# Patient Record
Sex: MALE | Race: WHITE | NOT HISPANIC OR LATINO | Employment: OTHER | URBAN - METROPOLITAN AREA
[De-identification: names, ages, dates, MRNs, and addresses within clinical notes are randomized per-mention and may not be internally consistent; named-entity substitution may affect disease eponyms.]

---

## 2017-06-26 ENCOUNTER — PRE VISIT (OUTPATIENT)
Dept: ORTHOPEDICS | Facility: CLINIC | Age: 77
End: 2017-06-26

## 2017-06-26 NOTE — TELEPHONE ENCOUNTER
1.  Date/reason for appt: 6/30/17 - Left Foot Great Toe Ingrown Toenail  2.  Referring provider: self  3.  Call to patient (Yes / No - short description): no, per appt notes: no outside records per pt  4.  Previous care at / records requested from: NONE

## 2017-06-30 ENCOUNTER — OFFICE VISIT (OUTPATIENT)
Dept: ORTHOPEDICS | Facility: CLINIC | Age: 77
End: 2017-06-30

## 2017-06-30 DIAGNOSIS — L60.0 ONYCHOCRYPTOSIS: Primary | ICD-10-CM

## 2017-06-30 DIAGNOSIS — M79.675 PAIN OF TOE OF LEFT FOOT: ICD-10-CM

## 2017-06-30 RX ORDER — TIMOLOL MALEATE 5 MG/ML
SOLUTION/ DROPS OPHTHALMIC
COMMUNITY
Start: 2017-05-21 | End: 2017-10-06

## 2017-06-30 NOTE — LETTER
6/30/2017       RE: Lucius Estrada  [address] [place] [address]     Dear Colleague,    Thank you for referring your patient, Lucius Estrada, to the [facility] at [facility]. Please see a copy of my visit note below.    Date of Service: 6/30/2017    Chief Complaint:   Chief Complaint   Patient presents with     Consult     Ingrown, left hallux. Pt stated that if you press on the left great toe nail it is painful. Pt is wondering if the doctor can clip his toenails and check his fet.         HPI: Lucius is a 76 year old male who presents today for further evaluation of left hallux ingrown nail. He relates that this is a chronic problem for him, although he does not feel like it is infected at this time. He has a podiatrist that he sees in [place] when this happens, as he spends half of the year there.     Review of Systems: No N/V/D/F/C/NS/CP/SOB    PMH:   Past Medical History:   Diagnosis Date     Basal cell carcinoma     melanoma x 2,  and multiple basal cell; see's Dr. Chavez and Dr. Victoria     BPH (benign prostatic hypertrophy)      Colon polyps     multiple; took sulindac     Glaucoma      Hypertension     ECHO 2016- Left ventricular systolic function is normal. The visual ejection fraction is estimated at 55-60%       PSxH:   Past Surgical History:   Procedure Laterality Date     COLONOSCOPY  11/1/2011    Procedure:COMBINED COLONOSCOPY, REMOVE TUMOR/POLYP/LESION BY SNARE; Colonoscopy ; Surgeon:VANESSA HALL; Location: GI     COLONOSCOPY  2/18/2014    Procedure: COMBINED COLONOSCOPY, SINGLE BIOPSY/POLYPECTOMY BY BIOPSY;;  Surgeon: Rudolph Tate MD;  Location:  GI     COLONOSCOPY  5/14/2014    Procedure: COMBINED COLONOSCOPY, SINGLE BIOPSY/POLYPECTOMY BY BIOPSY;  Surgeon: Rudolph Tate MD;  Location:  GI     COLONOSCOPY N/A 12/8/2014    Procedure: COMBINED COLONOSCOPY, SINGLE OR MULTIPLE BIOPSY/POLYPECTOMY BY BIOPSY;  Surgeon: Bebeto  Rudolph CASTAÑEDA MD;  Location:  GI     COLONOSCOPY N/A 9/21/2015    Procedure: COMBINED COLONOSCOPY, SINGLE OR MULTIPLE BIOPSY/POLYPECTOMY BY BIOPSY;  Surgeon: Rudolph Tate MD;  Location:  GI     COLONOSCOPY N/A 4/18/2016    Procedure: COLONOSCOPY;  Surgeon: Rudolph Tate MD;  Location:  GI     ESOPHAGOSCOPY, GASTROSCOPY, DUODENOSCOPY (EGD), COMBINED N/A 4/18/2016    Procedure: COMBINED ESOPHAGOSCOPY, GASTROSCOPY, DUODENOSCOPY (EGD), BIOPSY SINGLE OR MULTIPLE;  Surgeon: Rudolph Tate MD;  Location:  GI     HEMORRHOID SURGERY       SPHINCTEROTOMY RECTUM         Allergies: Amoxicillin and Brimonidine    SH:   Social History     Social History     Marital status:      Spouse name: N/A     Number of children: N/A     Years of education: N/A     Occupational History     Not on file.     Social History Main Topics     Smoking status: Never Smoker     Smokeless tobacco: Not on file     Alcohol use Yes      Comment: 1 a day     Drug use: No     Sexual activity: Not on file     Other Topics Concern     Not on file     Social History Narrative    Lives with wife,  30 + years; two children, two step children.  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 CRT is 3 seconds. Positive pedal hair.   Gross sensation is intact bilaterally.   Equinus is noted bilaterally. No pain with active or passive ROM of the ankle, MTJ, 1st ray, or halluces bilaterally,.   Nail on the left hallux is incurvated and painful at the lateral border. No s/s of infection are noted today. No open lesions are  noted.     Assessment: Onychocryptosis of the left hallux.    Plan:  - Pt seen and evaluated  - Left hallux nail was debrided with a slantback procedure. He tolerated this well with no complications. He can tape the toe to keep the nail away from the skin.  RTC PRN.      Kwame Hayes DPM

## 2017-06-30 NOTE — PROGRESS NOTES
Date of Service: 6/30/2017    Chief Complaint:   Chief Complaint   Patient presents with     Consult     Ingrown, left hallux. Pt stated that if you press on the left great toe nail it is painful. Pt is wondering if the doctor can clip his toenails and check his fet.         HPI: Lucius is a 76 year old male who presents today for further evaluation of left hallux ingrown nail. He relates that this is a chronic problem for him, although he does not feel like it is infected at this time. He has a podiatrist that he sees in [place] when this happens, as he spends half of the year there.     Review of Systems: No N/V/D/F/C/NS/CP/SOB    PMH:   Past Medical History:   Diagnosis Date     Basal cell carcinoma     melanoma x 2,  and multiple basal cell; see's Dr. Chavez and Dr. Victoria     BPH (benign prostatic hypertrophy)      Colon polyps     multiple; took sulindac     Glaucoma      Hypertension     ECHO 2016- Left ventricular systolic function is normal. The visual ejection fraction is estimated at 55-60%       PSxH:   Past Surgical History:   Procedure Laterality Date     COLONOSCOPY  11/1/2011    Procedure:COMBINED COLONOSCOPY, REMOVE TUMOR/POLYP/LESION BY SNARE; Colonoscopy ; Surgeon:VANESSA HALL; [facility]     COLONOSCOPY  2/18/2014    Procedure: COMBINED COLONOSCOPY, SINGLE BIOPSY/POLYPECTOMY BY BIOPSY;;  Surgeon: Rudolph Tate MD;  Location: [facility]     COLONOSCOPY  5/14/2014    Procedure: COMBINED COLONOSCOPY, SINGLE BIOPSY/POLYPECTOMY BY BIOPSY;  Surgeon: Rudolph Tate MD;  Location: [facility]     COLONOSCOPY N/A 12/8/2014    Procedure: COMBINED COLONOSCOPY, SINGLE OR MULTIPLE BIOPSY/POLYPECTOMY BY BIOPSY;  Surgeon: Rudolph Tate MD;  Location: [facility]     COLONOSCOPY N/A 9/21/2015    Procedure: COMBINED COLONOSCOPY, SINGLE OR MULTIPLE BIOPSY/POLYPECTOMY BY BIOPSY;  Surgeon: Rudolph Tate MD;  Location: [facility]     COLONOSCOPY N/A 4/18/2016    Procedure: COLONOSCOPY;  Surgeon: Rudolph Tate MD;   Location:  GI     ESOPHAGOSCOPY, GASTROSCOPY, DUODENOSCOPY (EGD), COMBINED N/A 4/18/2016    Procedure: COMBINED ESOPHAGOSCOPY, GASTROSCOPY, DUODENOSCOPY (EGD), BIOPSY SINGLE OR MULTIPLE;  Surgeon: Rudolph Tate MD;  Location:  GI     HEMORRHOID SURGERY       SPHINCTEROTOMY RECTUM         Allergies: Amoxicillin and Brimonidine    SH:   Social History     Social History     Marital status:      Spouse name: N/A     Number of children: N/A     Years of education: N/A     Occupational History     Not on file.     Social History Main Topics     Smoking status: Never Smoker     Smokeless tobacco: Not on file     Alcohol use Yes      Comment: 1 a day     Drug use: No     Sexual activity: Not on file     Other Topics Concern     Not on file     Social History Narrative    Lives with wife,  30 + years; two children, two step children.  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 CRT is 3 seconds. Positive pedal hair.   Gross sensation is intact bilaterally.   Equinus is noted bilaterally. No pain with active or passive ROM of the ankle, MTJ, 1st ray, or halluces bilaterally,.   Nail on the left hallux is incurvated and painful at the lateral border. No s/s of infection are noted today. No open lesions are noted.     Assessment: Onychocryptosis of the left hallux.    Plan:  - Pt seen and evaluated  - Left hallux nail was debrided with a slantback procedure. He tolerated this well with no complications. He can tape the toe to keep the nail away from the skin.  RTC PRN.

## 2017-06-30 NOTE — NURSING NOTE
Reason For Visit:   Chief Complaint   Patient presents with     Consult     Ingrown, left hallux. Pt stated that if you press on the left great toe nail it is painful. Pt is wondering if the doctor can clip his toenails and check his fet.        Pain Assessment  Patient Currently in Pain: Yes  Primary Pain Location:  (Hallux)  Pain Orientation: Left  Pain Descriptors: Sore  Aggravating Factors:  (Press on it)          Current Outpatient Prescriptions   Medication Sig Dispense Refill     timolol (TIMOPTIC) 0.5 % ophthalmic solution        losartan-hydrochlorothiazide (HYZAAR) 50-12.5 MG per tablet Take 1 tablet by mouth 2 times daily 30 tablet 3     doxazosin (CARDURA) 4 MG tablet Take 1 tablet at noon 30 tablet 11     AMLODIPINE BESYLATE PO Take 5 mg by mouth 2 times daily        SULINDAC PO Take 150 mg by mouth 2 times daily       timolol (TIMOPTIC) 0.25 % ophthalmic solution Place 1 drop into the right eye 2 times daily       aspirin 81 MG tablet Take 1 tablet by mouth daily.       MetroNIDazole (METROGEL) 1 % gel Apply 0.5 inches topically daily.       latanoprost (XALATAN) 0.005 % ophthalmic solution 1 drop At Bedtime.       finasteride (PROSCAR) 5 MG tablet Take 5 mg by mouth daily.            Allergies   Allergen Reactions     Amoxicillin Hives     Brimonidine Other (See Comments)     Eye drops, redness, stye

## 2017-06-30 NOTE — MR AVS SNAPSHOT
After Visit Summary   6/30/2017    Lucius Estrada    MRN: [id number]           Patient Information     Date Of Birth          1940        Visit Information        Provider Department      6/30/2017 11:20 AM Kwame Hayes DPM [facility]        Today's Diagnoses     Onychocryptosis    -  1    Pain of toe of left foot           Follow-ups after your visit        Your next 10 appointments already scheduled     Oct 06, 2017  1:50 PM CDT   (Arrive by 1:35 PM)   Return Visit with Samson Garza MD   [facility] ([facility])    [address] [facility] [address] [facility]  [address] [facility] [id number] [phone]            Oct 09, 2017   Procedure with Rudolph Tate MD   [facility] ([facility])    [address]  [place] [id number] [phone]           [facility] is located at [address] [place]              Who to contact     Please call your clinic at [phone] to:    Ask questions about your health    Make or cancel appointments    Discuss your medicines    Learn about your test results    Speak to your doctor   If you have compliments or concerns about an experience at your clinic, or if you wish to file a complaint, please contact [facility] Patient Relations at [phone] or email us at [email or web address]         Additional Information About Your Visit        MyChart Information     Ponte Solutionst is an electronic gateway that provides easy, online access to your medical records. With Tello, you can request a clinic appointment, read your test results, renew a prescription or communicate with your care team.     To sign up for Ponte Solutionst visit the website at [email or web address]   You will be asked to enter the access code listed below, as well as some personal information. Please follow the directions to create your username  and password.     Your access code is: [email or web address]  Expires: 2017  6:30 AM     Your access code will  in 90 days. If you need help or a new code, please contact your [facility] or call [phone] for assistance.        Care EveryWhere ID     This is your Care EveryWhere ID. This could be used by other organizations to access your [place] medical records  [id number]         Blood Pressure from Last 3 Encounters:   16 133/77   16 132/78   10/10/16 (!) 125/101    Weight from Last 3 Encounters:   16 89.3 kg (196 lb 12.8 oz)   16 86.2 kg (190 lb 1.6 oz)   10/10/16 87.1 kg (192 lb)              We Performed the Following     DEBRIDEMENT OF NAIL(S), 1-5        Primary Care Provider Office Phone # Fax #    [place] [facility] [phone]       [address] [facility] [address]        Equal Access to Services     [facility]: Hadii aad ku hadasho Soomaali, waaxda luqadaha, qaybta kaalmada adeegyada, waxay princessin hayamanda canas . So [facility] [phone].    ATENCIÓN: Si habla español, tiene a pitts disposición servicios gratuitos de asistencia lingüística. Gabriele al [phone].    We comply with applicable federal civil rights laws and [place] laws. We do not discriminate on the basis of race, color, national origin, age, disability sex, sexual orientation or gender identity.            Thank you!     Thank you for choosing [facility]THOP[facility]  for your care. Our goal is always to provide you with excellent care. Hearing back from our patients is one way we can continue to improve our services. Please take a few minutes to complete the written survey that you may receive in the mail after your visit with us. Thank you!             Your Updated Medication List - Protect others around you: Learn how to safely use, store and throw away your medicines at www.disposemymeds.org.          This list is  accurate as of: 6/30/17  4:21 PM.  Always use your most recent med list.                   Brand Name Dispense Instructions for use Diagnosis    AMLODIPINE BESYLATE PO      Take 5 mg by mouth 2 times daily        aspirin 81 MG tablet      Take 1 tablet by mouth daily.        doxazosin 4 MG tablet    CARDURA    30 tablet    Take 1 tablet at noon    Benign non-nodular prostatic hyperplasia, presence of lower urinary tract symptoms unspecified       finasteride 5 MG tablet    PROSCAR     Take 5 mg by mouth daily.        latanoprost 0.005 % ophthalmic solution    XALATAN     1 drop At Bedtime.        losartan-hydrochlorothiazide 50-12.5 MG per tablet    HYZAAR    30 tablet    Take 1 tablet by mouth 2 times daily    Essential hypertension with goal blood pressure less than 140/90       metroNIDAZOLE 1 % gel    METROGEL     Apply 0.5 inches topically daily.        SULINDAC PO      Take 150 mg by mouth 2 times daily        * timolol 0.25 % ophthalmic solution    TIMOPTIC     Place 1 drop into the right eye 2 times daily        * timolol 0.5 % ophthalmic solution    TIMOPTIC          * Notice:  This list has 2 medication(s) that are the same as other medications prescribed for you. Read the directions carefully, and ask your doctor or other care provider to review them with you.

## 2017-08-21 ENCOUNTER — OFFICE VISIT (OUTPATIENT)
Dept: ORTHOPEDICS | Facility: CLINIC | Age: 77
End: 2017-08-21

## 2017-08-21 DIAGNOSIS — L60.0 ONYCHOCRYPTOSIS: Primary | ICD-10-CM

## 2017-08-21 DIAGNOSIS — M79.674 PAIN IN TOES OF BOTH FEET: ICD-10-CM

## 2017-08-21 DIAGNOSIS — M79.675 PAIN IN TOES OF BOTH FEET: ICD-10-CM

## 2017-08-21 NOTE — MR AVS SNAPSHOT
After Visit Summary   8/21/2017    Lucius Estrada    MRN: [id number]           Patient Information     Date Of Birth          1940        Visit Information        Provider Department      8/21/2017 10:40 AM Kwame Hayes DPM [facility]        Today's Diagnoses     Onychocryptosis    -  1    Pain in toes of both feet           Follow-ups after your visit        Your next 10 appointments already scheduled     Oct 06, 2017  1:50 PM CDT   (Arrive by 1:35 PM)   Return Visit with Samson Garza MD   [facility] ([facility])    [address] [facility]  [address] [facility] [id number] [phone]            Oct 09, 2017   Procedure with Rudolph Tate MD   [facility] ([facility])    [address]  [place] [address] [phone]           [facility] is located at [address] [place]              Who to contact     Please call your clinic at [phone] to:    Ask questions about your health    Make or cancel appointments    Discuss your medicines    Learn about your test results    Speak to your doctor   If you have compliments or concerns about an experience at your clinic, or if you wish to file a complaint, please contact [facility] Patient Relations at [phone] or email us at [email or web address]         Additional Information About Your Visit        MyChart Information     [email or web address] is an electronic gateway that provides easy, online access to your medical records. With [email or web address], you can request a clinic appointment, read your test results, renew a prescription or communicate with your care team.     To sign up for Accedot visit the website at [email or web address]   You will be asked to enter the access code listed below, as well as some personal information. Please follow the directions to create your username  and password.     Your access code is: [email or web address]  Expires: 2017  6:30 AM     Your access code will  in 90 days. If you need help or a new code, please contact your [facility] or call [phone] for assistance.        Care EveryWhere ID     This is your Care EveryWhere ID. This could be used by other organizations to access your [place] medical records  [id number]         Blood Pressure from Last 3 Encounters:   16 133/77   16 132/78   10/10/16 (!) 125/101    Weight from Last 3 Encounters:   16 89.3 kg (196 lb 12.8 oz)   16 86.2 kg (190 lb 1.6 oz)   10/10/16 87.1 kg (192 lb)              Today, you had the following     No orders found for display       Primary Care Provider Office Phone # Fax #    [place] [facility] [phone]       [address] [facility] [address]        Equal Access to Services     [facility]: Hadii aad ku hadasho Soomaali, waaxda luqadaha, qaybta kaalmada adeegyada, waxkimberly landry hayamanda canas . So [facility] [phone].    ATENCIÓN: Si habla español, tiene a pitts disposición servicios gratuitos de asistencia lingüística. Llame al [phone].    We comply with applicable federal civil rights laws and [place] laws. We do not discriminate on the basis of race, color, national origin, age, disability sex, sexual orientation or gender identity.            Thank you!     Thank you for choosing [facility]  for your care. Our goal is always to provide you with excellent care. Hearing back from our patients is one way we can continue to improve our services. Please take a few minutes to complete the written survey that you may receive in the mail after your visit with us. Thank you!             Your Updated Medication List - Protect others around you: Learn how to safely use, store and throw away your medicines at www.disposemymeds.org.          This list is  accurate as of: 8/21/17 11:00 AM.  Always use your most recent med list.                   Brand Name Dispense Instructions for use Diagnosis    AMLODIPINE BESYLATE PO      Take 5 mg by mouth 2 times daily        aspirin 81 MG tablet      Take 1 tablet by mouth daily.        doxazosin 4 MG tablet    CARDURA    30 tablet    Take 1 tablet at noon    Benign non-nodular prostatic hyperplasia, presence of lower urinary tract symptoms unspecified       finasteride 5 MG tablet    PROSCAR     Take 5 mg by mouth daily.        latanoprost 0.005 % ophthalmic solution    XALATAN     1 drop At Bedtime.        losartan-hydrochlorothiazide 50-12.5 MG per tablet    HYZAAR    30 tablet    Take 1 tablet by mouth 2 times daily    Essential hypertension with goal blood pressure less than 140/90       metroNIDAZOLE 1 % gel    METROGEL     Apply 0.5 inches topically daily.        SULINDAC PO      Take 150 mg by mouth 2 times daily        * timolol 0.25 % ophthalmic solution    TIMOPTIC     Place 1 drop into the right eye 2 times daily        * timolol 0.5 % ophthalmic solution    TIMOPTIC          * Notice:  This list has 2 medication(s) that are the same as other medications prescribed for you. Read the directions carefully, and ask your doctor or other care provider to review them with you.

## 2017-08-21 NOTE — NURSING NOTE
Reason For Visit:   Chief Complaint   Patient presents with     RECHECK     Sore, bilateral great toes. Pt stated that he thinks the toenails were cut back to far and now his toenails and curving in and cutting into the skin.        Pain Assessment  Patient Currently in Pain: Denies (Pt stated that he is currently not having pain but it becomes very painful when sheets or shoes press on the toes. )  Primary Pain Location:  (Great toe)  Pain Orientation: Left, Right              Current Outpatient Prescriptions   Medication Sig Dispense Refill     timolol (TIMOPTIC) 0.5 % ophthalmic solution        losartan-hydrochlorothiazide (HYZAAR) 50-12.5 MG per tablet Take 1 tablet by mouth 2 times daily 30 tablet 3     doxazosin (CARDURA) 4 MG tablet Take 1 tablet at noon 30 tablet 11     AMLODIPINE BESYLATE PO Take 5 mg by mouth 2 times daily        SULINDAC PO Take 150 mg by mouth 2 times daily       timolol (TIMOPTIC) 0.25 % ophthalmic solution Place 1 drop into the right eye 2 times daily       aspirin 81 MG tablet Take 1 tablet by mouth daily.       MetroNIDazole (METROGEL) 1 % gel Apply 0.5 inches topically daily.       latanoprost (XALATAN) 0.005 % ophthalmic solution 1 drop At Bedtime.       finasteride (PROSCAR) 5 MG tablet Take 5 mg by mouth daily.            Allergies   Allergen Reactions     Amoxicillin Hives     Brimonidine Other (See Comments)     Eye drops, redness, stye

## 2017-08-21 NOTE — LETTER
8/21/2017       RE: Lucius Estrada  [address] [place] [address]     Dear Colleague,    Thank you for referring your patient, Lucius Estrada, to the [facility] at [facility]. Please see a copy of my visit note below.    Chief Complaints and History of Present Illnesses   Patient presents with     RECHECK     Sore, bilateral great toes. Pt stated that he thinks the toenails were cut back to far and now his toenails and curving in and cutting into the skin.      Allergies   Allergen Reactions     Amoxicillin Hives     Brimonidine Other (See Comments)     Eye drops, redness, stye     Subjective: Lucius is a 76 year old male who presents to the clinic today for a follow up of BL hallux pain. He relates that up until recently, he has been soaking the toe and that was helpful for him. He relates that he has not been taping the toe. He relates that he recently went fishing and he stopped soaking the toes during that trip. He notes that then, the toes began to hurt again.     Objective    Incurvated BL hallux nails at both medial and lateral borders. Small amount of purulent drainage noted to the right lateral border. No other s/s of infection. Pain noted with palpation of BL nail borders distally of both halluces.     Assessment: Onychocryptosis BL halluces with pain.     Plan:   - Pt seen and evaluated  - The medial and lateral distal borders of both of halluces were removed with a slantback procedure. He tolerated these well with no complications.  - Taught him Arai taping technique for both borders of the hallux nails. He should do this nightly.   - Pt to return to clinic PRN.    Again, thank you for allowing me to participate in the care of your patient.      Sincerely,    Kwame Hayes DPM

## 2017-08-21 NOTE — PROGRESS NOTES
Chief Complaints and History of Present Illnesses   Patient presents with     RECHECK     Sore, bilateral great toes. Pt stated that he thinks the toenails were cut back to far and now his toenails and curving in and cutting into the skin.             Allergies   Allergen Reactions     Amoxicillin Hives     Brimonidine Other (See Comments)     Eye drops, redness, stye         Subjective: Lucius is a 76 year old male who presents to the clinic today for a follow up of BL hallux pain. He relates that up until recently, he has been soaking the toe and that was helpful for him. He relates that he has not been taping the toe. He relates that he recently went fishing and he stopped soaking the toes during that trip. He notes that then, the toes began to hurt again.     Objective    Incurvated BL hallux nails at both medial and lateral borders. Small amount of purulent drainage noted to the right lateral border. No other s/s of infection. Pain noted with palpation of BL nail borders distally of both halluces.     Assessment: Onychocryptosis BL halluces with pain.     Plan:   - Pt seen and evaluated  - The medial and lateral distal borders of both of halluces were removed with a slantback procedure. He tolerated these well with no complications.  - Taught him Arai taping technique for both borders of the hallux nails. He should do this nightly.   - Pt to return to clinic PRN.

## 2017-09-25 NOTE — PATIENT DISCUSSION
(O78.412) Vitreous degeneration, bilateral - Assesment : Examination revealed PVD No holes or tears seen today - Plan : Monitor for changes. Advised patient to call our office with decreased vision or an increase in flashes and/or floaters.  1 year Exam

## 2017-10-06 ENCOUNTER — OFFICE VISIT (OUTPATIENT)
Dept: INTERNAL MEDICINE | Facility: CLINIC | Age: 77
End: 2017-10-06

## 2017-10-06 VITALS
RESPIRATION RATE: 16 BRPM | BODY MASS INDEX: 28.33 KG/M2 | HEART RATE: 75 BPM | WEIGHT: 203.1 LBS | DIASTOLIC BLOOD PRESSURE: 69 MMHG | SYSTOLIC BLOOD PRESSURE: 109 MMHG

## 2017-10-06 DIAGNOSIS — N52.2 DRUG-INDUCED ERECTILE DYSFUNCTION: ICD-10-CM

## 2017-10-06 DIAGNOSIS — I10 ESSENTIAL HYPERTENSION: Primary | ICD-10-CM

## 2017-10-06 DIAGNOSIS — D12.6 ADENOMATOUS POLYP OF COLON, UNSPECIFIED PART OF COLON: ICD-10-CM

## 2017-10-06 RX ORDER — TADALAFIL 10 MG/1
10 TABLET ORAL DAILY PRN
Qty: 7 TABLET | Refills: 3 | Status: SHIPPED | OUTPATIENT
Start: 2017-10-06

## 2017-10-06 RX ORDER — AMLODIPINE BESYLATE 5 MG/1
5 TABLET ORAL DAILY
Qty: 30 TABLET | Refills: 3 | Status: SHIPPED | OUTPATIENT
Start: 2017-10-06 | End: 2017-10-18

## 2017-10-06 ASSESSMENT — PAIN SCALES - GENERAL: PAINLEVEL: NO PAIN (0)

## 2017-10-06 NOTE — PATIENT INSTRUCTIONS
[facility]: [phone]     [facility] Medication Refill Request Information:  * Please contact your pharmacy regarding ANY request for medication refills.  ** [facility] Prescription Fax = [phone]  * Please allow 3 business days for routine medication refills.  * Please allow 5 business days for controlled substance medication refills.     [facility] Test Result notification information:  *You will be notified with in 7-10 days of your appointment day regarding the results of your test.  If you are on MyChart you will be notified as soon as the provider has reviewed the results and signed off on them.

## 2017-10-06 NOTE — NURSING NOTE
Chief Complaint   Patient presents with     Hypertension     patient states he is here for a bp check     MANOJ SNOW at 1:34 PM on 10/6/2017.

## 2017-10-06 NOTE — PROGRESS NOTES
SUBJECTIVE: Lucius Estrada is a 76 year old male who presents for routine follow up of HTN.  He has been on blood pressure treatment for 2 years. He is having side effects of erectile dysfunction.  He was prescribed Sildenafil 20 mg ; he felt dizzy when he first tried it.  He tried it again, but ineffective.  He has increased the dose to 80 mg without producing results.      He is otherwise physically feeling well. Recently getting over URI.  He denies chest pain, dyspnea, no dizziness, LE swelling, orthopnea.  He has a yearly screening colonoscopy scheduled next week for hx of multiple adenomatous polyps.  He has been on sulindac for this.  He has not had evidence of polyps for the past 2 years.      His BP's at his last PE was 127/78.      Current Outpatient Prescriptions   Medication Sig Dispense Refill     amLODIPine (NORVASC) 5 MG tablet Take 1 tablet (5 mg) by mouth daily 30 tablet 3     tadalafil (CIALIS) 10 MG tablet Take 1 tablet (10 mg) by mouth daily as needed 7 tablet 3     losartan-hydrochlorothiazide (HYZAAR) 50-12.5 MG per tablet Take 1 tablet by mouth 2 times daily 30 tablet 3     doxazosin (CARDURA) 4 MG tablet Take 1 tablet at noon 30 tablet 11     SULINDAC PO Take 150 mg by mouth 2 times daily       timolol (TIMOPTIC) 0.25 % ophthalmic solution Place 1 drop into the right eye 2 times daily       aspirin 81 MG tablet Take 1 tablet by mouth daily.       MetroNIDazole (METROGEL) 1 % gel Apply 0.5 inches topically daily.       latanoprost (XALATAN) 0.005 % ophthalmic solution 1 drop At Bedtime.       finasteride (PROSCAR) 5 MG tablet Take 5 mg by mouth daily.       [DISCONTINUED] AMLODIPINE BESYLATE PO Take 5 mg by mouth 2 times daily         Patient Active Problem List   Diagnosis     Essential hypertension     Colon polyps        OBJECTIVE: /69  Pulse 75  Resp 16  Wt 92.1 kg (203 lb 1.6 oz)  BMI 28.33 kg/m2   /69  Pulse 75  Resp 16  Wt 92.1 kg (203 lb 1.6 oz)  BMI 28.33  kg/m2  GENERAL APPEARANCE: healthy, alert and no distress  NECK: no adenopathy, no asymmetry, masses, or scars and thyroid normal to palpation  RESP: lungs clear to auscultation - no rales, rhonchi or wheezes  CV: regular rates and rhythm, normal S1 S2, no S3 or S4 and no murmur, click or rub -  ABDOMEN:  soft, nontender, no HSM or masses and bowel sounds normal  MS: extremities normal- no gross deformities noted, no evidence of inflammation in joints, FROM in all extremities.  Ext: warm.  Edema NO    ASSESSMENT: Lucius Estrada is a 76 year old male who presents with follow up HTN and side effects of erectile dysfunction   (I10) Essential hypertension  (primary encounter diagnosis)  Comment: stable blood pressure today; suspect his elevated BP is related to sulindac therapy for his colon polyps  Plan: amLODIPine (NORVASC) 5 MG tablet        If Cialis or increased dosing for Viagra are ineffective for ED, consider decreasing Amlodipine to 2.5 mg daily or DC if blood pressure stable    (D12.6) Adenomatous polyp of colon, unspecified part of colon  Comment: follow up colonoscopy next week  Plan: follow    (N52.2) Drug-induced erectile dysfunction  Comment: increase Sildenafil to 100 mg, if ineffective, trial of Cialis 10 mg up to 20 mg   Plan: tadalafil (CIALIS) 10 MG tablet        Follow up in 6 months.      Pt will call to let me know which regimen is working with good blood pressure control

## 2017-10-06 NOTE — MR AVS SNAPSHOT
After Visit Summary   10/6/2017    Lucius Estrada    MRN: [id number]           Patient Information     Date Of Birth          1940        Visit Information        Provider Department      10/6/2017 1:50 PM Samson Garza MD [facility]        Today's Diagnoses     Essential hypertension    -  1    Adenomatous polyp of colon, unspecified part of colon        Drug-induced erectile dysfunction          Care Instructions    Primary Care Center: [phone]     [facility] Center Medication Refill Request Information:  * Please contact your pharmacy regarding ANY request for medication refills.  ** [facility] Prescription Fax = [phone]  * Please allow 3 business days for routine medication refills.  * Please allow 5 business days for controlled substance medication refills.     [facility] Center Test Result notification information:  *You will be notified with in 7-10 days of your appointment day regarding the results of your test.  If you are on MyChart you will be notified as soon as the provider has reviewed the results and signed off on them.            Follow-ups after your visit        Follow-up notes from your care team     Return in about 6 months (around 4/6/2018).      Your next 10 appointments already scheduled     Oct 09, 2017   Procedure with Rudolph Tate MD   [facility] ([facility])    [address]  [place] [address] [phone]           [facility] [place] is located at [address] [place]              Who to contact     Please call your clinic at [phone] to:    Ask questions about your health    Make or cancel appointments    Discuss your medicines    Learn about your test results    Speak to your doctor   If you have compliments or concerns about an experience at your clinic, or if you wish to file a complaint, please contact [facility] Patient Relations at [phone] or  email us at [email or web address]         Additional Information About Your Visit        [employer] Information     [employer] is an electronic gateway that provides easy, online access to your medical records. With [employer], you can request a clinic appointment, read your test results, renew a prescription or communicate with your care team.     To sign up for [employer] visit the website at [email or web address]   You will be asked to enter the access code listed below, as well as some personal information. Please follow the directions to create your username and password.     Your access code is: [email or web address]  Expires: 2017  6:30 AM     Your access code will  in 90 days. If you need help or a new code, please contact your [facility] or call [phone] for assistance.        Care EveryWhere ID     This is your Care EveryWhere ID. This could be used by other organizations to access your [place] medical records  [id number]        Your Vitals Were     Pulse Respirations BMI (Body Mass Index)             75 16 28.33 kg/m2          Blood Pressure from Last 3 Encounters:   10/06/17 109/69   16 133/77   16 132/78    Weight from Last 3 Encounters:   10/06/17 92.1 kg (203 lb 1.6 oz)   16 89.3 kg (196 lb 12.8 oz)   16 86.2 kg (190 lb 1.6 oz)              Today, you had the following     No orders found for display         Today's Medication Changes          These changes are accurate as of: 10/6/17  2:46 PM.  If you have any questions, ask your nurse or doctor.               Start taking these medicines.        Dose/Directions    tadalafil 10 MG tablet   Commonly known as:  CIALIS   Used for:  Drug-induced erectile dysfunction   Started by:  Samson Garza MD        Dose:  10 mg   Take 1 tablet (10 mg) by mouth daily as needed   Quantity:  7 tablet   Refills:  3         These medicines have changed or have updated prescriptions.         Dose/Directions    amLODIPine 5 MG tablet   Commonly known as:  NORVASC   This may have changed:    - medication strength  - when to take this   Used for:  Essential hypertension   Changed by:  Samson Garza MD        Dose:  5 mg   Take 1 tablet (5 mg) by mouth daily   Quantity:  30 tablet   Refills:  3       timolol 0.25 % ophthalmic solution   Commonly known as:  TIMOPTIC   This may have changed:  Another medication with the same name was removed. Continue taking this medication, and follow the directions you see here.        Dose:  1 drop   Place 1 drop into the right eye 2 times daily   Refills:  0            Where to get your medicines      These medications were sent to [facility] [address] [place] [address] AT [facility] [address] & [facility]  [address] [address], [facility] [address]     Phone:  [phone]     amLODIPine 5 MG tablet    tadalafil 10 MG tablet                Primary Care Provider Office Phone # Fax #    [facility] [phone]       [facility] [address] [place] [address] [address] [facility] [address]        Equal Access to Services     THOMAS ALFORD : Hadii aad ku hadasho Soomaali, waaxda luqadaha, qaybta kaalmada adeegyada, waxkimberly canas . So [facility] [phone].    ATENCIÓN: Si habla español, tiene a pitts disposición servicios gratuitos de asistencia lingüística. [facility] [phone].    We comply with applicable federal civil rights laws and [place] laws. We do not discriminate on the basis of race, color, national origin, age, disability, sex, sexual orientation, or gender identity.            Thank you!     Thank you for choosing [facility]  for your care. Our goal is always to provide you with excellent care. Hearing back from our patients is one way we can continue to improve our services. Please take a few minutes to complete the written survey that you may receive in the mail after your visit with us.  Thank you!             Your Updated Medication List - Protect others around you: Learn how to safely use, store and throw away your medicines at www.disposemymeds.org.          This list is accurate as of: 10/6/17  2:46 PM.  Always use your most recent med list.                   Brand Name Dispense Instructions for use Diagnosis    amLODIPine 5 MG tablet    NORVASC    30 tablet    Take 1 tablet (5 mg) by mouth daily    Essential hypertension       aspirin 81 MG tablet      Take 1 tablet by mouth daily.        doxazosin 4 MG tablet    CARDURA    30 tablet    Take 1 tablet at noon    Benign non-nodular prostatic hyperplasia, presence of lower urinary tract symptoms unspecified       finasteride 5 MG tablet    PROSCAR     Take 5 mg by mouth daily.        latanoprost 0.005 % ophthalmic solution    XALATAN     1 drop At Bedtime.        losartan-hydrochlorothiazide 50-12.5 MG per tablet    HYZAAR    30 tablet    Take 1 tablet by mouth 2 times daily    Essential hypertension with goal blood pressure less than 140/90       metroNIDAZOLE 1 % gel    METROGEL     Apply 0.5 inches topically daily.        SULINDAC PO      Take 150 mg by mouth 2 times daily        tadalafil 10 MG tablet    CIALIS    7 tablet    Take 1 tablet (10 mg) by mouth daily as needed    Drug-induced erectile dysfunction       timolol 0.25 % ophthalmic solution    TIMOPTIC     Place 1 drop into the right eye 2 times daily

## 2017-10-09 ENCOUNTER — SURGERY (OUTPATIENT)
Age: 77
End: 2017-10-09

## 2017-10-09 ENCOUNTER — HOSPITAL ENCOUNTER (OUTPATIENT)
Facility: CLINIC | Age: 77
Discharge: HOME | End: 2017-10-09
Attending: COLON & RECTAL SURGERY | Admitting: COLON & RECTAL SURGERY
Payer: MEDICARE

## 2017-10-09 VITALS
WEIGHT: 200 LBS | SYSTOLIC BLOOD PRESSURE: 126 MMHG | HEIGHT: 72 IN | BODY MASS INDEX: 27.09 KG/M2 | RESPIRATION RATE: 13 BRPM | OXYGEN SATURATION: 96 % | DIASTOLIC BLOOD PRESSURE: 93 MMHG | HEART RATE: 55 BPM

## 2017-10-09 LAB — COLONOSCOPY: NORMAL

## 2017-10-09 PROCEDURE — G0500 MOD SEDAT ENDO SERVICE >5YRS: HCPCS | Performed by: COLON & RECTAL SURGERY

## 2017-10-09 PROCEDURE — 45378 DIAGNOSTIC COLONOSCOPY: CPT | Performed by: COLON & RECTAL SURGERY

## 2017-10-09 PROCEDURE — 25000128 H RX IP 250 OP 636: Performed by: COLON & RECTAL SURGERY

## 2017-10-09 PROCEDURE — G0121 COLON CA SCRN NOT HI RSK IND: HCPCS | Performed by: COLON & RECTAL SURGERY

## 2017-10-09 RX ORDER — ONDANSETRON 2 MG/ML
4 INJECTION INTRAMUSCULAR; INTRAVENOUS
Status: DISCONTINUED | OUTPATIENT
Start: 2017-10-09 | End: 2017-10-09 | Stop reason: HOSPADM

## 2017-10-09 RX ORDER — FENTANYL CITRATE 50 UG/ML
INJECTION, SOLUTION INTRAMUSCULAR; INTRAVENOUS PRN
Status: DISCONTINUED | OUTPATIENT
Start: 2017-10-09 | End: 2017-10-09 | Stop reason: HOSPADM

## 2017-10-09 RX ORDER — LIDOCAINE 40 MG/G
CREAM TOPICAL
Status: DISCONTINUED | OUTPATIENT
Start: 2017-10-09 | End: 2017-10-09 | Stop reason: HOSPADM

## 2017-10-09 RX ADMIN — MIDAZOLAM HYDROCHLORIDE 2 MG: 1 INJECTION, SOLUTION INTRAMUSCULAR; INTRAVENOUS at 07:29

## 2017-10-09 RX ADMIN — FENTANYL CITRATE 100 MCG: 50 INJECTION, SOLUTION INTRAMUSCULAR; INTRAVENOUS at 07:27

## 2017-10-13 ENCOUNTER — TELEPHONE (OUTPATIENT)
Dept: INTERNAL MEDICINE | Facility: CLINIC | Age: 77
End: 2017-10-13

## 2017-10-13 DIAGNOSIS — I10 ESSENTIAL HYPERTENSION: ICD-10-CM

## 2017-10-13 NOTE — TELEPHONE ENCOUNTER
Regarding: Blood pressure  Contact: [phone]  Pt is requesting a call back from Dr. Garza to discuss about his blood pressure.   Cell: [phone]  If its going to take a while for Dr. Garza to call him then he can speak with you.     October 13, 2017 12:24 PM  Spoke with patient. He was seen by Dr. Garza on 10/6/17 regarding his blood pressure. States that he has been having episodes of dizziness, most often in the morning after breakfast and in the evening after dinner. He checked his BP during one of these episodes with result of 106/55, although he thinks it might have gone even lower than that. Has not taken his BP at other times, as he recently had his physical, and BP was running fine. He has been taking amlodipine 5mg daily, and wondering if it might be appropriate to decrease that dose.   Grace Collado RN, Care Coordinator

## 2017-10-17 NOTE — TELEPHONE ENCOUNTER
He can decrease his Amlodipine to 2.5 mg daily or discontinue.  He should check is BP 3-4 times/ week to see what his BPs do.  I left a message on his phone to call me back.  George

## 2017-10-18 RX ORDER — AMLODIPINE BESYLATE 5 MG/1
2.5 TABLET ORAL DAILY
Qty: 45 TABLET | Refills: 0
Start: 2017-10-18

## 2017-10-18 NOTE — TELEPHONE ENCOUNTER
Spoke with patient. Reviewed instructions from Dr. Garza. Patient is planning to decrease to the amlodipine 2.5mg daily (not discontinue at this time). Will monitor BP and contact clinic with any further needs. Med list updated.

## 2019-01-14 ENCOUNTER — APPOINTMENT (RX ONLY)
Dept: URBAN - METROPOLITAN AREA CLINIC 149 | Facility: CLINIC | Age: 79
Setting detail: DERMATOLOGY
End: 2019-01-14

## 2019-01-14 DIAGNOSIS — L57.0 ACTINIC KERATOSIS: ICD-10-CM

## 2019-01-14 PROBLEM — Z85.828 PERSONAL HISTORY OF OTHER MALIGNANT NEOPLASM OF SKIN: Status: ACTIVE | Noted: 2019-01-14

## 2019-01-14 PROCEDURE — 17000 DESTRUCT PREMALG LESION: CPT

## 2019-01-14 PROCEDURE — ? LIQUID NITROGEN

## 2019-01-14 ASSESSMENT — LOCATION ZONE DERM: LOCATION ZONE: HAND

## 2019-01-14 ASSESSMENT — LOCATION SIMPLE DESCRIPTION DERM: LOCATION SIMPLE: RIGHT HAND

## 2019-01-14 ASSESSMENT — LOCATION DETAILED DESCRIPTION DERM: LOCATION DETAILED: RIGHT RADIAL DORSAL HAND

## 2019-01-14 NOTE — PROCEDURE: LIQUID NITROGEN
Detail Level: Simple
Consent: The patient's consent was obtained including but not limited to risks of crusting, scabbing, blistering, scarring, darker or lighter pigmentary change, recurrence, incomplete removal and infection.
Render Post-Care Instructions In Note?: yes
Number Of Freeze-Thaw Cycles: 2 freeze-thaw cycles
Duration Of Freeze Thaw-Cycle (Seconds): 0
Post-Care Instructions: I reviewed with the patient in detail post-care instructions. Patient is to wear sunprotection, and avoid picking at any of the treated lesions. Pt may apply Vaseline to crusted or scabbing areas.

## 2019-02-26 ENCOUNTER — APPOINTMENT (RX ONLY)
Dept: URBAN - METROPOLITAN AREA CLINIC 149 | Facility: CLINIC | Age: 79
Setting detail: DERMATOLOGY
End: 2019-02-26

## 2019-02-26 DIAGNOSIS — L82.0 INFLAMED SEBORRHEIC KERATOSIS: ICD-10-CM

## 2019-02-26 DIAGNOSIS — L57.0 ACTINIC KERATOSIS: ICD-10-CM | Status: RESOLVED

## 2019-02-26 PROCEDURE — ? LIQUID NITROGEN

## 2019-02-26 PROCEDURE — 17110 DESTRUCTION B9 LES UP TO 14: CPT

## 2019-02-26 PROCEDURE — 99213 OFFICE O/P EST LOW 20 MIN: CPT | Mod: 25

## 2019-02-26 PROCEDURE — ? COUNSELING

## 2019-02-26 ASSESSMENT — LOCATION SIMPLE DESCRIPTION DERM
LOCATION SIMPLE: RIGHT FOREARM
LOCATION SIMPLE: RIGHT SHOULDER
LOCATION SIMPLE: RIGHT HAND

## 2019-02-26 ASSESSMENT — LOCATION ZONE DERM
LOCATION ZONE: ARM
LOCATION ZONE: HAND

## 2019-02-26 ASSESSMENT — LOCATION DETAILED DESCRIPTION DERM
LOCATION DETAILED: RIGHT VENTRAL PROXIMAL FOREARM
LOCATION DETAILED: RIGHT RADIAL DORSAL HAND
LOCATION DETAILED: RIGHT ANTERIOR SHOULDER
LOCATION DETAILED: RIGHT DISTAL DORSAL FOREARM

## 2019-02-26 NOTE — PROCEDURE: LIQUID NITROGEN
Detail Level: Simple
Include Z78.9 (Other Specified Conditions Influencing Health Status) As An Associated Diagnosis?: No
Consent: The patient's consent was obtained including but not limited to risks of crusting, scabbing, blistering, scarring, darker or lighter pigmentary change, recurrence, incomplete removal and infection.
Medical Necessity Clause: This procedure was medically necessary because the lesions that were treated were: at risk for and/or subject to recurrent physical trauma as a result of lesion type and location with history of the same, bleeding and irritated.
Medical Necessity Information: It is in your best interest to select a reason for this procedure from the list below. All of these items fulfill various CMS LCD requirements except the new and changing color options.
Number Of Freeze-Thaw Cycles: 2 freeze-thaw cycles
Render Post-Care Instructions In Note?: yes
Duration Of Freeze Thaw-Cycle (Seconds): 3
Post-Care Instructions: I reviewed with the patient in detail post-care instructions. Patient is to wear sunprotection, and avoid picking at any of the treated lesions. Pt may apply Vaseline to crusted or scabbing areas.

## 2019-03-13 ENCOUNTER — APPOINTMENT (RX ONLY)
Dept: URBAN - METROPOLITAN AREA CLINIC 149 | Facility: CLINIC | Age: 79
Setting detail: DERMATOLOGY
End: 2019-03-13

## 2019-03-13 DIAGNOSIS — L57.0 ACTINIC KERATOSIS: ICD-10-CM

## 2019-03-13 PROCEDURE — ? LIQUID NITROGEN

## 2019-03-13 PROCEDURE — 17000 DESTRUCT PREMALG LESION: CPT

## 2019-03-13 ASSESSMENT — LOCATION DETAILED DESCRIPTION DERM: LOCATION DETAILED: LEFT INFERIOR ANTERIOR NECK

## 2019-03-13 ASSESSMENT — LOCATION ZONE DERM: LOCATION ZONE: NECK

## 2019-03-13 ASSESSMENT — LOCATION SIMPLE DESCRIPTION DERM: LOCATION SIMPLE: LEFT ANTERIOR NECK

## 2019-03-13 NOTE — PROCEDURE: LIQUID NITROGEN
Number Of Freeze-Thaw Cycles: 2 freeze-thaw cycles
Duration Of Freeze Thaw-Cycle (Seconds): 0
Post-Care Instructions: I reviewed with the patient in detail post-care instructions. Patient is to wear sunprotection, and avoid picking at any of the treated lesions. Pt may apply Vaseline to crusted or scabbing areas.
Render Post-Care Instructions In Note?: yes
Detail Level: Detailed
Consent: The patient's consent was obtained including but not limited to risks of crusting, scabbing, blistering, scarring, darker or lighter pigmentary change, recurrence, incomplete removal and infection.

## 2019-06-25 NOTE — PATIENT DISCUSSION
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 Advised patient to call our office with decreased vision or increased symptoms.  AT'S PRN OU for comfort 1 YEAR EXAM

## 2019-06-25 NOTE — PATIENT DISCUSSION
(H10.45) Other chronic allergic conjunctivitis - Assesment : Examination revealed Allergic Conjunctivitis. - Plan : Monitor for changes. Advised patient to call our office with decreased vision or an increase in symptoms.    CONTINUE ZADITOR 2X DAY PAZEO (2 SAMPLES GIVEN)

## 2019-06-25 NOTE — PATIENT DISCUSSION
(N36.784) Vitreous degeneration, bilateral - Assesment : Examination revealed PVD FLOATERS ARE UNCHANGED FOR THE LAST 2 MONTHS AND PATIENT DENIES FLASHES. NO TEARS OR DETACHMENTS SEEN TODAY - Plan : Monitor for changes. Advised patient to call our office with decreased vision or an increase in flashes and/or floaters.

## 2019-06-25 NOTE — PATIENT DISCUSSION
(H01.001) Unspecified blepharitis right upper eyelid - Assesment : Examination revealed Blepharitis. - Plan : Warm soaks with massage to eyelid two times daily.  START MAXITROL OINTMENT AT NIGHT FOR 10 DAYS

## 2019-10-07 ENCOUNTER — HOSPITAL ENCOUNTER (OUTPATIENT)
Facility: CLINIC | Age: 79
Discharge: HOME | End: 2019-10-07
Attending: COLON & RECTAL SURGERY | Admitting: COLON & RECTAL SURGERY
Payer: MEDICARE

## 2019-10-07 VITALS
OXYGEN SATURATION: 99 % | WEIGHT: 190 LBS | DIASTOLIC BLOOD PRESSURE: 72 MMHG | HEIGHT: 71 IN | SYSTOLIC BLOOD PRESSURE: 134 MMHG | BODY MASS INDEX: 26.6 KG/M2 | HEART RATE: 62 BPM | RESPIRATION RATE: 9 BRPM

## 2019-10-07 LAB — COLONOSCOPY: NORMAL

## 2019-10-07 PROCEDURE — 45380 COLONOSCOPY AND BIOPSY: CPT | Performed by: COLON & RECTAL SURGERY

## 2019-10-07 PROCEDURE — 88305 TISSUE EXAM BY PATHOLOGIST: CPT | Mod: 26 | Performed by: COLON & RECTAL SURGERY

## 2019-10-07 PROCEDURE — G0500 MOD SEDAT ENDO SERVICE >5YRS: HCPCS | Performed by: COLON & RECTAL SURGERY

## 2019-10-07 PROCEDURE — 88305 TISSUE EXAM BY PATHOLOGIST: CPT | Performed by: COLON & RECTAL SURGERY

## 2019-10-07 PROCEDURE — 25000128 H RX IP 250 OP 636: Performed by: COLON & RECTAL SURGERY

## 2019-10-07 RX ORDER — PROCHLORPERAZINE MALEATE 5 MG
5 TABLET ORAL EVERY 6 HOURS PRN
Status: DISCONTINUED | OUTPATIENT
Start: 2019-10-07 | End: 2019-10-07 | Stop reason: HOSPADM

## 2019-10-07 RX ORDER — ONDANSETRON 2 MG/ML
4 INJECTION INTRAMUSCULAR; INTRAVENOUS EVERY 6 HOURS PRN
Status: DISCONTINUED | OUTPATIENT
Start: 2019-10-07 | End: 2019-10-07 | Stop reason: HOSPADM

## 2019-10-07 RX ORDER — ONDANSETRON 2 MG/ML
4 INJECTION INTRAMUSCULAR; INTRAVENOUS
Status: DISCONTINUED | OUTPATIENT
Start: 2019-10-07 | End: 2019-10-07 | Stop reason: HOSPADM

## 2019-10-07 RX ORDER — NALOXONE HYDROCHLORIDE 0.4 MG/ML
.1-.4 INJECTION, SOLUTION INTRAMUSCULAR; INTRAVENOUS; SUBCUTANEOUS
Status: DISCONTINUED | OUTPATIENT
Start: 2019-10-07 | End: 2019-10-07 | Stop reason: HOSPADM

## 2019-10-07 RX ORDER — LIDOCAINE 40 MG/G
CREAM TOPICAL
Status: DISCONTINUED | OUTPATIENT
Start: 2019-10-07 | End: 2019-10-07 | Stop reason: HOSPADM

## 2019-10-07 RX ORDER — DIPHENHYDRAMINE HCL 25 MG
25 CAPSULE ORAL EVERY 4 HOURS PRN
Status: DISCONTINUED | OUTPATIENT
Start: 2019-10-07 | End: 2019-10-07 | Stop reason: HOSPADM

## 2019-10-07 RX ORDER — FLUMAZENIL 0.1 MG/ML
0.2 INJECTION, SOLUTION INTRAVENOUS
Status: DISCONTINUED | OUTPATIENT
Start: 2019-10-07 | End: 2019-10-07 | Stop reason: HOSPADM

## 2019-10-07 RX ORDER — FENTANYL CITRATE 50 UG/ML
INJECTION, SOLUTION INTRAMUSCULAR; INTRAVENOUS PRN
Status: DISCONTINUED | OUTPATIENT
Start: 2019-10-07 | End: 2019-10-07 | Stop reason: HOSPADM

## 2019-10-07 RX ORDER — ONDANSETRON 4 MG/1
4 TABLET, ORALLY DISINTEGRATING ORAL EVERY 6 HOURS PRN
Status: DISCONTINUED | OUTPATIENT
Start: 2019-10-07 | End: 2019-10-07 | Stop reason: HOSPADM

## 2019-10-07 RX ORDER — DIPHENHYDRAMINE HYDROCHLORIDE 50 MG/ML
25 INJECTION INTRAMUSCULAR; INTRAVENOUS EVERY 4 HOURS PRN
Status: DISCONTINUED | OUTPATIENT
Start: 2019-10-07 | End: 2019-10-07 | Stop reason: HOSPADM

## 2019-10-07 ASSESSMENT — MIFFLIN-ST. JEOR: SCORE: 1586.02

## 2019-10-07 NOTE — H&P
Pre-Endoscopy History and Physical     Lucius Estrada MRN# [id number]   YOB: 1940 Age: 78 year old     Date of Procedure: 10/7/2019  Primary care provider: [place] Cochran  Type of Endoscopy: colonoscopy  Reason for Procedure: screening  Type of Anesthesia Anticipated: Moderate Sedation    HPI:    Lucius is a 78 year old male who will be undergoing the above procedure.      A history and physical has been performed. The patient's medications and allergies have been reviewed. The risks and benefits of the procedure including the risk of bleeding, perforation, and missed lesions as well as the sedation options and risks were discussed with the patient.  All questions were answered and informed consent was obtained.      Allergies   Allergen Reactions     Amoxicillin Hives     Brimonidine Other (See Comments)     Eye drops, redness, stye        Current Facility-Administered Medications   Medication     lidocaine (LMX4) cream     lidocaine 1 % 0.1-1 mL     ondansetron (ZOFRAN) injection 4 mg     sodium chloride (PF) 0.9% PF flush 3 mL     sodium chloride (PF) 0.9% PF flush 3 mL       Medications Prior to Admission   Medication Sig Dispense Refill Last Dose     amLODIPine (NORVASC) 5 MG tablet Take 0.5 tablets (2.5 mg) by mouth daily 45 tablet 0 10/6/2019 at Unknown time     finasteride (PROSCAR) 5 MG tablet Take 5 mg by mouth daily.   10/6/2019 at Unknown time     latanoprost (XALATAN) 0.005 % ophthalmic solution 1 drop At Bedtime.   10/6/2019 at Unknown time     losartan-hydrochlorothiazide (HYZAAR) 50-12.5 MG per tablet Take 1 tablet by mouth 2 times daily 30 tablet 3 10/6/2019 at Unknown time     MetroNIDazole (METROGEL) 1 % gel Apply 0.5 inches topically daily.   10/8/2017     SULINDAC PO Take 150 mg by mouth 2 times daily   9/30/2019     tadalafil (CIALIS) 10 MG tablet Take 1 tablet (10 mg) by mouth daily as needed 7 tablet 3      timolol (TIMOPTIC) 0.25 % ophthalmic solution Place 1 drop into the  right eye 2 times daily   10/9/2017       Patient Active Problem List   Diagnosis     Essential hypertension     Colon polyps     Drug-induced erectile dysfunction        Past Medical History:   Diagnosis Date     Basal cell carcinoma     melanoma x 2,  and multiple basal cell; see's Dr. Chavez and Dr. Victoria     BPH (benign prostatic hypertrophy)      Glaucoma      Hypertension     ECHO 2016- Left ventricular systolic function is normal. The visual ejection fraction is estimated at 55-60%     Multiple Colon polyps     multiple; took sulindac        Past Surgical History:   Procedure Laterality Date     COLONOSCOPY  11/1/2011    Procedure:COMBINED COLONOSCOPY, REMOVE TUMOR/POLYP/LESION BY SNARE; Colonoscopy ; Surgeon:VANESSA HALL; Location:[facility]     COLONOSCOPY  2/18/2014    Procedure: COMBINED COLONOSCOPY, SINGLE BIOPSY/POLYPECTOMY BY BIOPSY;;  Surgeon: Rudolph Tate MD;  Location: [facility]     COLONOSCOPY  5/14/2014    Procedure: COMBINED COLONOSCOPY, SINGLE BIOPSY/POLYPECTOMY BY BIOPSY;  Surgeon: Rudolph Tate MD;  Location: [facility]     COLONOSCOPY N/A 12/8/2014    Procedure: COMBINED COLONOSCOPY, SINGLE OR MULTIPLE BIOPSY/POLYPECTOMY BY BIOPSY;  Surgeon: Rudolph Tate MD;  Location: [facility]     COLONOSCOPY N/A 9/21/2015    Procedure: COMBINED COLONOSCOPY, SINGLE OR MULTIPLE BIOPSY/POLYPECTOMY BY BIOPSY;  Surgeon: Rudolph Tate MD;  Location: [facility]     COLONOSCOPY N/A 4/18/2016    Procedure: COLONOSCOPY;  Surgeon: Rudolph Tate MD;  Location: [facility]     COLONOSCOPY N/A 10/9/2017    Procedure: COLONOSCOPY;;  Surgeon: Rudolph Tate MD;  Location: [facility]     ESOPHAGOSCOPY, GASTROSCOPY, DUODENOSCOPY (EGD), COMBINED N/A 4/18/2016    Procedure: COMBINED ESOPHAGOSCOPY, GASTROSCOPY, DUODENOSCOPY (EGD), BIOPSY SINGLE OR MULTIPLE;  Surgeon: Rudolph Tate MD;  Location: [facility]     HEMORRHOID SURGERY       SPHINCTEROTOMY RECTUM         Social History     Tobacco Use     Smoking status: Never Smoker     Smokeless  "tobacco: Never Used   Substance Use Topics     Alcohol use: Yes     Comment: 1-2 glasses of wine per week       Family History   Problem Relation Age of Onset     Hypertension Mother      Hypertension Father      Coronary Artery Disease Father      Coronary Artery Disease Maternal Grandfather      Colon Cancer No family hx of          PHYSICAL EXAM:   BP (!) 148/85   Pulse 70   Ht 1.791 m (5' 10.5\")   Wt 86.2 kg (190 lb)   SpO2 98%   BMI 26.88 kg/m   Estimated body mass index is 26.88 kg/m  as calculated from the following:    Height as of this encounter: 1.791 m (5' 10.5\").    Weight as of this encounter: 86.2 kg (190 lb).   Mental status - alert and oriented  RESP: lungs clear  CV: RRR  AIRWAY EXAM: Mallampatti Class II (visualization of the soft palate, fauces, and uvula)    IMPRESSION   ASA Class 2 - Mild systemic disease      Signed Electronically by: Rudolph Tate MD  October 7, 2019    Colorectal Surgery  [phone] (office)  [phone] (pager)  [email or web address]          "

## 2019-10-08 LAB — COPATH REPORT: NORMAL

## 2020-12-15 ENCOUNTER — APPOINTMENT (RX ONLY)
Dept: URBAN - METROPOLITAN AREA CLINIC 149 | Facility: CLINIC | Age: 80
Setting detail: DERMATOLOGY
End: 2020-12-15

## 2020-12-15 VITALS — TEMPERATURE: 96.9 F

## 2020-12-15 DIAGNOSIS — L57.0 ACTINIC KERATOSIS: ICD-10-CM

## 2020-12-15 DIAGNOSIS — L82.1 OTHER SEBORRHEIC KERATOSIS: ICD-10-CM

## 2020-12-15 DIAGNOSIS — D18.0 HEMANGIOMA: ICD-10-CM

## 2020-12-15 DIAGNOSIS — D22 MELANOCYTIC NEVI: ICD-10-CM

## 2020-12-15 DIAGNOSIS — Z85.828 PERSONAL HISTORY OF OTHER MALIGNANT NEOPLASM OF SKIN: ICD-10-CM | Status: RESOLVED

## 2020-12-15 DIAGNOSIS — L81.4 OTHER MELANIN HYPERPIGMENTATION: ICD-10-CM

## 2020-12-15 PROBLEM — D18.01 HEMANGIOMA OF SKIN AND SUBCUTANEOUS TISSUE: Status: ACTIVE | Noted: 2020-12-15

## 2020-12-15 PROBLEM — D22.5 MELANOCYTIC NEVI OF TRUNK: Status: ACTIVE | Noted: 2020-12-15

## 2020-12-15 PROBLEM — D48.5 NEOPLASM OF UNCERTAIN BEHAVIOR OF SKIN: Status: ACTIVE | Noted: 2020-12-15

## 2020-12-15 PROBLEM — D23.39 OTHER BENIGN NEOPLASM OF SKIN OF OTHER PARTS OF FACE: Status: ACTIVE | Noted: 2020-12-15

## 2020-12-15 PROBLEM — D23.71 OTHER BENIGN NEOPLASM OF SKIN OF RIGHT LOWER LIMB, INCLUDING HIP: Status: ACTIVE | Noted: 2020-12-15

## 2020-12-15 PROBLEM — D23.72 OTHER BENIGN NEOPLASM OF SKIN OF LEFT LOWER LIMB, INCLUDING HIP: Status: ACTIVE | Noted: 2020-12-15

## 2020-12-15 PROCEDURE — ? SUNSCREEN RECOMMENDATIONS

## 2020-12-15 PROCEDURE — ? FULL BODY SKIN EXAM

## 2020-12-15 PROCEDURE — ? ADDITIONAL NOTES

## 2020-12-15 PROCEDURE — ? COUNSELING

## 2020-12-15 PROCEDURE — 17000 DESTRUCT PREMALG LESION: CPT | Mod: 59

## 2020-12-15 PROCEDURE — 17003 DESTRUCT PREMALG LES 2-14: CPT

## 2020-12-15 PROCEDURE — ? LIQUID NITROGEN

## 2020-12-15 PROCEDURE — 11102 TANGNTL BX SKIN SINGLE LES: CPT

## 2020-12-15 PROCEDURE — ? BIOPSY BY SHAVE METHOD

## 2020-12-15 PROCEDURE — 99213 OFFICE O/P EST LOW 20 MIN: CPT | Mod: 25

## 2020-12-15 ASSESSMENT — LOCATION DETAILED DESCRIPTION DERM
LOCATION DETAILED: LEFT SUPERIOR UPPER BACK
LOCATION DETAILED: RIGHT ANTERIOR SHOULDER
LOCATION DETAILED: LEFT DISTAL DORSAL FOREARM
LOCATION DETAILED: RIGHT INFRAMAMMARY CREASE (INNER QUADRANT)
LOCATION DETAILED: LEFT PROXIMAL DORSAL FOREARM
LOCATION DETAILED: RIGHT PROXIMAL DORSAL FOREARM
LOCATION DETAILED: RIGHT SUPERIOR UPPER BACK
LOCATION DETAILED: LEFT FOREHEAD

## 2020-12-15 ASSESSMENT — LOCATION ZONE DERM
LOCATION ZONE: TRUNK
LOCATION ZONE: ARM
LOCATION ZONE: FACE

## 2020-12-15 ASSESSMENT — LOCATION SIMPLE DESCRIPTION DERM
LOCATION SIMPLE: RIGHT SHOULDER
LOCATION SIMPLE: RIGHT FOREARM
LOCATION SIMPLE: LEFT FOREHEAD
LOCATION SIMPLE: LEFT UPPER BACK
LOCATION SIMPLE: LEFT FOREARM
LOCATION SIMPLE: RIGHT UPPER BACK
LOCATION SIMPLE: RIGHT BREAST

## 2020-12-15 NOTE — PROCEDURE: LIQUID NITROGEN
Duration Of Freeze Thaw-Cycle (Seconds): 0
Consent: The patient's consent was obtained including but not limited to risks of crusting, scabbing, blistering, scarring, darker or lighter pigmentary change, recurrence, incomplete removal and infection.
Render Post-Care Instructions In Note?: yes
Detail Level: Detailed
Number Of Freeze-Thaw Cycles: 2 freeze-thaw cycles
Post-Care Instructions: I reviewed with the patient in detail post-care instructions. Patient is to wear sunprotection, and avoid picking at any of the treated lesions. Pt may apply Vaseline to crusted or scabbing areas.
Render Note In Bullet Format When Appropriate: No

## 2020-12-22 ENCOUNTER — APPOINTMENT (RX ONLY)
Dept: URBAN - METROPOLITAN AREA CLINIC 149 | Facility: CLINIC | Age: 80
Setting detail: DERMATOLOGY
End: 2020-12-22

## 2020-12-22 VITALS — TEMPERATURE: 96.5 F

## 2020-12-22 DIAGNOSIS — L20.89 OTHER ATOPIC DERMATITIS: ICD-10-CM

## 2020-12-22 PROBLEM — L30.9 DERMATITIS, UNSPECIFIED: Status: ACTIVE | Noted: 2020-12-22

## 2020-12-22 PROCEDURE — 11102 TANGNTL BX SKIN SINGLE LES: CPT | Mod: 79

## 2020-12-22 PROCEDURE — ? BIOPSY BY SHAVE METHOD

## 2020-12-22 ASSESSMENT — LOCATION SIMPLE DESCRIPTION DERM: LOCATION SIMPLE: LEFT UPPER ARM

## 2020-12-22 ASSESSMENT — LOCATION ZONE DERM: LOCATION ZONE: ARM

## 2020-12-22 ASSESSMENT — LOCATION DETAILED DESCRIPTION DERM: LOCATION DETAILED: LEFT PROXIMAL POSTERIOR UPPER ARM

## 2020-12-22 NOTE — PROCEDURE: BIOPSY BY SHAVE METHOD
Detail Level: Detailed
Depth Of Biopsy: dermis
Was A Bandage Applied: Yes
Size Of Lesion In Cm: 0
Biopsy Type: H and E
Biopsy Method: curette
Anesthesia Type: 1% lidocaine with epinephrine
Anesthesia Volume In Cc (Will Not Render If 0): 0.5
Hemostasis: Aluminum Chloride
Wound Care: Vaseline
Dressing: bandage
Destruction After The Procedure: No
Type Of Destruction Used: Curettage
Curettage Text: The wound bed was treated with curettage after the biopsy was performed.
Cryotherapy Text: The wound bed was treated with cryotherapy after the biopsy was performed.
Electrodesiccation Text: The wound bed was treated with electrodesiccation after the biopsy was performed.
Electrodesiccation And Curettage Text: The wound bed was treated with electrodesiccation and curettage after the biopsy was performed.
Silver Nitrate Text: The wound bed was treated with silver nitrate after the biopsy was performed.
Lab: 6
Consent: Written consent was obtained and risks were reviewed including but not limited to scarring, infection, bleeding, scabbing, incomplete removal, nerve damage and allergy to anesthesia.
Post-Care Instructions: I reviewed with the patient in detail post-care instructions. Patient is to keep the biopsy site dry overnight, and then apply bacitracin twice daily until healed.
Notification Instructions: Patient will be notified of biopsy results. However, patient instructed to call the office if not contacted within 2 weeks.
Billing Type: Third-Party Bill
Information: Selecting Yes will display possible errors in your note based on the variables you have selected. This validation is only offered as a suggestion for you. PLEASE NOTE THAT THE VALIDATION TEXT WILL BE REMOVED WHEN YOU FINALIZE YOUR NOTE. IF YOU WANT TO FAX A PRELIMINARY NOTE YOU WILL NEED TO TOGGLE THIS TO 'NO' IF YOU DO NOT WANT IT IN YOUR FAXED NOTE.

## 2020-12-29 ENCOUNTER — APPOINTMENT (RX ONLY)
Dept: URBAN - METROPOLITAN AREA CLINIC 148 | Facility: CLINIC | Age: 80
Setting detail: DERMATOLOGY
End: 2020-12-29

## 2020-12-29 VITALS — TEMPERATURE: 96.8 F

## 2020-12-29 PROBLEM — C44.319 BASAL CELL CARCINOMA OF SKIN OF OTHER PARTS OF FACE: Status: ACTIVE | Noted: 2020-12-29

## 2020-12-29 PROCEDURE — ? ADDITIONAL NOTES

## 2020-12-29 PROCEDURE — ? MOHS SURGERY

## 2020-12-29 PROCEDURE — 17311 MOHS 1 STAGE H/N/HF/G: CPT

## 2020-12-29 PROCEDURE — 13132 CMPLX RPR F/C/C/M/N/AX/G/H/F: CPT

## 2020-12-29 NOTE — PROCEDURE: MOHS SURGERY
independent Rhomboid Transposition Flap Text: The defect edges were debeveled with a #15 scalpel blade.  Given the location of the defect and the proximity to free margins a rhomboid transposition flap was deemed most appropriate.  Using a sterile surgical marker, an appropriate rhomboid flap was drawn incorporating the defect.    The area thus outlined was incised deep to adipose tissue with a #15 scalpel blade.  The skin margins were undermined to an appropriate distance in all directions utilizing iris scissors.

## 2021-01-06 ENCOUNTER — APPOINTMENT (RX ONLY)
Dept: URBAN - METROPOLITAN AREA CLINIC 148 | Facility: CLINIC | Age: 81
Setting detail: DERMATOLOGY
End: 2021-01-06

## 2021-01-06 DIAGNOSIS — Z48.02 ENCOUNTER FOR REMOVAL OF SUTURES: ICD-10-CM

## 2021-01-06 PROCEDURE — ? SUTURE REMOVAL (GLOBAL PERIOD)

## 2021-01-06 PROCEDURE — 99024 POSTOP FOLLOW-UP VISIT: CPT

## 2021-01-06 ASSESSMENT — LOCATION ZONE DERM: LOCATION ZONE: FACE

## 2021-01-06 ASSESSMENT — LOCATION DETAILED DESCRIPTION DERM: LOCATION DETAILED: LEFT MEDIAL TEMPLE

## 2021-01-06 ASSESSMENT — LOCATION SIMPLE DESCRIPTION DERM: LOCATION SIMPLE: LEFT TEMPLE

## 2021-01-06 NOTE — PROCEDURE: SUTURE REMOVAL (GLOBAL PERIOD)
Detail Level: Detailed
Add 87445 Cpt? (Important Note: In 2017 The Use Of 92523 Is Being Tracked By Cms To Determine Future Global Period Reimbursement For Global Periods): yes

## 2021-01-20 ENCOUNTER — IMPORTED ENCOUNTER (OUTPATIENT)
Dept: URBAN - METROPOLITAN AREA CLINIC 31 | Facility: CLINIC | Age: 81
End: 2021-01-20

## 2021-01-20 PROBLEM — H34.8322: Noted: 2021-01-20

## 2021-01-20 PROBLEM — H40.1412: Noted: 2021-01-20

## 2021-01-20 PROBLEM — H40.1421: Noted: 2021-01-20

## 2021-01-20 PROBLEM — Z96.1: Noted: 2021-01-20

## 2021-01-20 PROCEDURE — 99203 OFFICE O/P NEW LOW 30 MIN: CPT

## 2021-01-20 NOTE — PATIENT DISCUSSION
PXE OS:   IOP borderline but had normal VF last time and IOP has been stable. Continue with current treatment plan. Discussed importance of compliance. Will continue to monitor for stability or progression. f/u DR Balderas Elver on return to [place]

## 2021-01-20 NOTE — PATIENT DISCUSSION
1.  PXE OD -  Tmax 49 IOP excellent today. Continue with current treatment plan. Discussed importance of compliance. Will continue to monitor for stability or progression. 2.  PXE OS:   IOP borderline but had normal VF last time and IOP has been stable. Continue with current treatment plan. Discussed importance of compliance. Will continue to monitor for stability or progression. f/u DR Ruth Ann Davidson on return to [facility]. BRVO OS - stable monitor4. Pseudophakia OU - IOLs stable. Monitor for changes in vision.

## 2021-01-26 ENCOUNTER — APPOINTMENT (RX ONLY)
Dept: URBAN - METROPOLITAN AREA CLINIC 149 | Facility: CLINIC | Age: 81
Setting detail: DERMATOLOGY
End: 2021-01-26

## 2021-01-26 DIAGNOSIS — L57.0 ACTINIC KERATOSIS: ICD-10-CM

## 2021-01-26 DIAGNOSIS — L81.4 OTHER MELANIN HYPERPIGMENTATION: ICD-10-CM

## 2021-01-26 DIAGNOSIS — L20.89 OTHER ATOPIC DERMATITIS: ICD-10-CM

## 2021-01-26 PROBLEM — L20.84 INTRINSIC (ALLERGIC) ECZEMA: Status: ACTIVE | Noted: 2021-01-26

## 2021-01-26 PROCEDURE — 99214 OFFICE O/P EST MOD 30 MIN: CPT | Mod: 25

## 2021-01-26 PROCEDURE — ? COUNSELING

## 2021-01-26 PROCEDURE — ? LIQUID NITROGEN

## 2021-01-26 PROCEDURE — 17000 DESTRUCT PREMALG LESION: CPT

## 2021-01-26 PROCEDURE — ? PATHOLOGY DISCUSSION

## 2021-01-26 PROCEDURE — 17003 DESTRUCT PREMALG LES 2-14: CPT

## 2021-01-26 PROCEDURE — ? ADDITIONAL NOTES

## 2021-01-26 PROCEDURE — ? PRESCRIPTION

## 2021-01-26 PROCEDURE — ? SUNSCREEN RECOMMENDATIONS

## 2021-01-26 RX ORDER — TRIAMCINOLONE ACETONIDE 1 MG/G
CREAM TOPICAL
Qty: 1 | Refills: 0 | Status: ERX | COMMUNITY
Start: 2021-01-26

## 2021-01-26 RX ORDER — FLUOROURACIL 5 MG/G
CREAM TOPICAL
Qty: 1 | Refills: 0 | Status: ERX | COMMUNITY
Start: 2021-01-26

## 2021-01-26 RX ADMIN — TRIAMCINOLONE ACETONIDE: 1 CREAM TOPICAL at 00:00

## 2021-01-26 RX ADMIN — FLUOROURACIL: 5 CREAM TOPICAL at 00:00

## 2021-01-26 ASSESSMENT — LOCATION ZONE DERM
LOCATION ZONE: ARM
LOCATION ZONE: TRUNK

## 2021-01-26 ASSESSMENT — LOCATION SIMPLE DESCRIPTION DERM
LOCATION SIMPLE: LEFT UPPER ARM
LOCATION SIMPLE: LEFT FOREARM
LOCATION SIMPLE: RIGHT CLAVICULAR SKIN
LOCATION SIMPLE: RIGHT FOREARM
LOCATION SIMPLE: CHEST
LOCATION SIMPLE: RIGHT UPPER BACK

## 2021-01-26 ASSESSMENT — LOCATION DETAILED DESCRIPTION DERM
LOCATION DETAILED: STERNUM
LOCATION DETAILED: LEFT DISTAL LATERAL POSTERIOR UPPER ARM
LOCATION DETAILED: RIGHT PROXIMAL DORSAL FOREARM
LOCATION DETAILED: RIGHT SUPERIOR MEDIAL UPPER BACK
LOCATION DETAILED: RIGHT SUPERIOR UPPER BACK
LOCATION DETAILED: RIGHT DISTAL DORSAL FOREARM
LOCATION DETAILED: LEFT PROXIMAL POSTERIOR UPPER ARM
LOCATION DETAILED: LEFT PROXIMAL DORSAL FOREARM
LOCATION DETAILED: RIGHT CLAVICULAR SKIN

## 2021-01-26 NOTE — PROCEDURE: LIQUID NITROGEN
Detail Level: Detailed
Render Post-Care Instructions In Note?: yes
Post-Care Instructions: I reviewed with the patient in detail post-care instructions. Patient is to wear sunprotection, and avoid picking at any of the treated lesions. Pt may apply Vaseline to crusted or scabbing areas.
Consent: The patient's consent was obtained including but not limited to risks of crusting, scabbing, blistering, scarring, darker or lighter pigmentary change, recurrence, incomplete removal and infection.
Duration Of Freeze Thaw-Cycle (Seconds): 0
Number Of Freeze-Thaw Cycles: 2 freeze-thaw cycles
Render Note In Bullet Format When Appropriate: No

## 2021-01-26 NOTE — PROCEDURE: COUNSELING
Detail Level: Zone
Sunscreen Recommendations: Aveeno sunscreen 50+ SPF and Coolibar Sun-protective clothes UPF 50+

## 2021-03-30 ENCOUNTER — APPOINTMENT (RX ONLY)
Dept: URBAN - METROPOLITAN AREA CLINIC 149 | Facility: CLINIC | Age: 81
Setting detail: DERMATOLOGY
End: 2021-03-30

## 2021-03-30 VITALS — TEMPERATURE: 97.6 F

## 2021-03-30 DIAGNOSIS — L81.4 OTHER MELANIN HYPERPIGMENTATION: ICD-10-CM

## 2021-03-30 DIAGNOSIS — L82.0 INFLAMED SEBORRHEIC KERATOSIS: ICD-10-CM

## 2021-03-30 DIAGNOSIS — D18.0 HEMANGIOMA: ICD-10-CM

## 2021-03-30 DIAGNOSIS — D22 MELANOCYTIC NEVI: ICD-10-CM

## 2021-03-30 DIAGNOSIS — Z85.828 PERSONAL HISTORY OF OTHER MALIGNANT NEOPLASM OF SKIN: ICD-10-CM

## 2021-03-30 DIAGNOSIS — L73.8 OTHER SPECIFIED FOLLICULAR DISORDERS: ICD-10-CM

## 2021-03-30 DIAGNOSIS — B07.8 OTHER VIRAL WARTS: ICD-10-CM

## 2021-03-30 DIAGNOSIS — L82.1 OTHER SEBORRHEIC KERATOSIS: ICD-10-CM

## 2021-03-30 PROBLEM — D22.5 MELANOCYTIC NEVI OF TRUNK: Status: ACTIVE | Noted: 2021-03-30

## 2021-03-30 PROBLEM — D48.5 NEOPLASM OF UNCERTAIN BEHAVIOR OF SKIN: Status: ACTIVE | Noted: 2021-03-30

## 2021-03-30 PROBLEM — D18.01 HEMANGIOMA OF SKIN AND SUBCUTANEOUS TISSUE: Status: ACTIVE | Noted: 2021-03-30

## 2021-03-30 PROCEDURE — ? ADDITIONAL NOTES

## 2021-03-30 PROCEDURE — 99213 OFFICE O/P EST LOW 20 MIN: CPT | Mod: 25

## 2021-03-30 PROCEDURE — ? LIQUID NITROGEN

## 2021-03-30 PROCEDURE — ? FULL BODY SKIN EXAM

## 2021-03-30 PROCEDURE — 11102 TANGNTL BX SKIN SINGLE LES: CPT | Mod: 59

## 2021-03-30 PROCEDURE — 17110 DESTRUCTION B9 LES UP TO 14: CPT

## 2021-03-30 PROCEDURE — ? COUNSELING

## 2021-03-30 PROCEDURE — ? SUNSCREEN RECOMMENDATIONS

## 2021-03-30 PROCEDURE — ? BIOPSY BY SHAVE METHOD

## 2021-03-30 ASSESSMENT — LOCATION ZONE DERM
LOCATION ZONE: ARM
LOCATION ZONE: NECK
LOCATION ZONE: LEG
LOCATION ZONE: TRUNK
LOCATION ZONE: FACE
LOCATION ZONE: HAND

## 2021-03-30 ASSESSMENT — LOCATION SIMPLE DESCRIPTION DERM
LOCATION SIMPLE: LEFT UPPER ARM
LOCATION SIMPLE: RIGHT FOREHEAD
LOCATION SIMPLE: LEFT HAND
LOCATION SIMPLE: RIGHT FOREARM
LOCATION SIMPLE: RIGHT SHOULDER
LOCATION SIMPLE: RIGHT ANKLE
LOCATION SIMPLE: RIGHT BREAST
LOCATION SIMPLE: LEFT ELBOW
LOCATION SIMPLE: LEFT UPPER BACK
LOCATION SIMPLE: RIGHT UPPER BACK
LOCATION SIMPLE: LEFT FOREARM
LOCATION SIMPLE: TRAPEZIAL NECK
LOCATION SIMPLE: RIGHT CALF

## 2021-03-30 ASSESSMENT — LOCATION DETAILED DESCRIPTION DERM
LOCATION DETAILED: RIGHT POSTERIOR ANKLE
LOCATION DETAILED: RIGHT PROXIMAL DORSAL FOREARM
LOCATION DETAILED: LEFT SUPERIOR UPPER BACK
LOCATION DETAILED: LEFT DISTAL LATERAL POSTERIOR UPPER ARM
LOCATION DETAILED: LEFT DISTAL POSTERIOR UPPER ARM
LOCATION DETAILED: RIGHT INFRAMAMMARY CREASE (INNER QUADRANT)
LOCATION DETAILED: RIGHT ANTERIOR SHOULDER
LOCATION DETAILED: RIGHT PROXIMAL CALF
LOCATION DETAILED: RIGHT SUPERIOR UPPER BACK
LOCATION DETAILED: LEFT PROXIMAL DORSAL FOREARM
LOCATION DETAILED: MID TRAPEZIAL NECK
LOCATION DETAILED: RIGHT MEDIAL FOREHEAD
LOCATION DETAILED: LEFT RADIAL DORSAL HAND
LOCATION DETAILED: LEFT ELBOW

## 2021-03-30 NOTE — PROCEDURE: LIQUID NITROGEN
Post-Care Instructions: I reviewed with the patient in detail post-care instructions. Patient is to wear sunprotection, and avoid picking at any of the treated lesions. Pt may apply Vaseline to crusted or scabbing areas.
Duration Of Freeze Thaw-Cycle (Seconds): 3
Medical Necessity Information: It is in your best interest to select a reason for this procedure from the list below. All of these items fulfill various CMS LCD requirements except the new and changing color options.
Detail Level: Simple
Consent: The patient's consent was obtained including but not limited to risks of crusting, scabbing, blistering, scarring, darker or lighter pigmentary change, recurrence, incomplete removal and infection.
Include Z78.9 (Other Specified Conditions Influencing Health Status) As An Associated Diagnosis?: Yes
Add 52 Modifier (Optional): no
Medical Necessity Clause: This procedure was medically necessary because the lesions that were treated were: at risk for and/or subject to recurrent physical trauma as a result of lesion type and location with history of the same, bleeding and irritated.
Number Of Freeze-Thaw Cycles: 2 freeze-thaw cycles

## 2021-04-20 NOTE — PROCEDURE: MOHS SURGERY
----- Message from Raeann Phillips MD sent at 4/20/2021  7:07 AM CDT -----  CMP with sugar 154 consistent with DM. There is slight elevation in alk phos which is likely from mild fatty liver  CBC normal with no anemia.   Dressing (No Sutures): dry sterile dressing

## 2021-08-04 NOTE — H&P
Pre-Endoscopy History and Physical     Lucius Estrada MRN# [id number]   YOB: 1940 Age: 76 year old     Date of Procedure: 10/9/2017  Primary care provider: Chino Cochran  Type of Endoscopy: colonoscopy  Reason for Procedure: h/o polyposis  Type of Anesthesia Anticipated: Moderate Sedation    HPI:    Lucius is a 76 year old male who will be undergoing the above procedure.      A history and physical has been performed. The patient's medications and allergies have been reviewed. The risks and benefits of the procedure including the risk of bleeding, perforation, and missed lesions as well as the sedation options and risks were discussed with the patient.  All questions were answered and informed consent was obtained.      Allergies   Allergen Reactions     Amoxicillin Hives     Brimonidine Other (See Comments)     Eye drops, redness, stye        No current facility-administered medications for this encounter.        Prescriptions Prior to Admission   Medication Sig Dispense Refill Last Dose     amLODIPine (NORVASC) 5 MG tablet Take 1 tablet (5 mg) by mouth daily 30 tablet 3 10/9/2017     losartan-hydrochlorothiazide (HYZAAR) 50-12.5 MG per tablet Take 1 tablet by mouth 2 times daily 30 tablet 3 10/9/2017     doxazosin (CARDURA) 4 MG tablet Take 1 tablet at noon 30 tablet 11 10/8/2017     SULINDAC PO Take 150 mg by mouth 2 times daily   Past Week     timolol (TIMOPTIC) 0.25 % ophthalmic solution Place 1 drop into the right eye 2 times daily   10/9/2017     aspirin 81 MG tablet Take 1 tablet by mouth daily.   Past Week     MetroNIDazole (METROGEL) 1 % gel Apply 0.5 inches topically daily.   10/8/2017     latanoprost (XALATAN) 0.005 % ophthalmic solution 1 drop At Bedtime.   10/9/2017     finasteride (PROSCAR) 5 MG tablet Take 5 mg by mouth daily.   10/9/2017     tadalafil (CIALIS) 10 MG tablet Take 1 tablet (10 mg) by mouth daily as needed 7 tablet 3        Patient Active Problem List   Diagnosis      Essential hypertension     Colon polyps     Drug-induced erectile dysfunction        Past Medical History:   Diagnosis Date     Basal cell carcinoma     melanoma x 2,  and multiple basal cell; see's Dr. Chavez and Dr. Victoria     BPH (benign prostatic hypertrophy)      Glaucoma      Hypertension     ECHO 2016- Left ventricular systolic function is normal. The visual ejection fraction is estimated at 55-60%     Multiple Colon polyps     multiple; took sulindac        Past Surgical History:   Procedure Laterality Date     COLONOSCOPY  11/1/2011    Procedure:COMBINED COLONOSCOPY, REMOVE TUMOR/POLYP/LESION BY SNARE; Colonoscopy ; Surgeon:VANESSA HALL; Location:[facility]     COLONOSCOPY  2/18/2014    Procedure: COMBINED COLONOSCOPY, SINGLE BIOPSY/POLYPECTOMY BY BIOPSY;;  Surgeon: Rudolph Tate MD;  Location: [facility]     COLONOSCOPY  5/14/2014    Procedure: COMBINED COLONOSCOPY, SINGLE BIOPSY/POLYPECTOMY BY BIOPSY;  Surgeon: Rudolph Tate MD;  Location: [facility]     COLONOSCOPY N/A 12/8/2014    Procedure: COMBINED COLONOSCOPY, SINGLE OR MULTIPLE BIOPSY/POLYPECTOMY BY BIOPSY;  Surgeon: Rudolph Tate MD;  Location: [facility]     COLONOSCOPY N/A 9/21/2015    Procedure: COMBINED COLONOSCOPY, SINGLE OR MULTIPLE BIOPSY/POLYPECTOMY BY BIOPSY;  Surgeon: Rudolph Tate MD;  Location: [facility]     COLONOSCOPY N/A 4/18/2016    Procedure: COLONOSCOPY;  Surgeon: Rudolph Tate MD;  Location: [facility]     ESOPHAGOSCOPY, GASTROSCOPY, DUODENOSCOPY (EGD), COMBINED N/A 4/18/2016    Procedure: COMBINED ESOPHAGOSCOPY, GASTROSCOPY, DUODENOSCOPY (EGD), BIOPSY SINGLE OR MULTIPLE;  Surgeon: Rudolph Ttae MD;  Location: [facility]     HEMORRHOID SURGERY       SPHINCTEROTOMY RECTUM         Social History   Substance Use Topics     Smoking status: Never Smoker     Smokeless tobacco: Never Used     Alcohol use Yes      Comment: 1-2 glasses of wine per week       Family History   Problem Relation Age of Onset     Hypertension Mother      Hypertension Father       Coronary Artery Disease Father      Coronary Artery Disease Maternal Grandfather      Colon Cancer No family hx of          PHYSICAL EXAM:   /77  Pulse 55  Resp 18  Ht 1.829 m (6')  Wt 90.7 kg (200 lb)  SpO2 98%  BMI 27.12 kg/m2 Estimated body mass index is 27.12 kg/(m^2) as calculated from the following:    Height as of this encounter: 1.829 m (6').    Weight as of this encounter: 90.7 kg (200 lb).   Mental status - alert and oriented  RESP: lungs clear  CV: RRR  AIRWAY EXAM: Mallampatti Class II (visualization of the soft palate, fauces, and uvula)    IMPRESSION   ASA Class 2 - Mild systemic disease      Signed Electronically by: Rudolph Tate  October 9, 2017    Colorectal Surgery  [phone] (office)  [phone] (pager)  [email or web address]           Tyler BERNABE

## 2021-09-07 DIAGNOSIS — Z11.59 ENCOUNTER FOR SCREENING FOR OTHER VIRAL DISEASES: ICD-10-CM

## 2021-10-08 ENCOUNTER — LAB (OUTPATIENT)
Dept: URGENT CARE | Facility: URGENT CARE | Age: 81
End: 2021-10-08
Payer: MEDICARE

## 2021-10-08 DIAGNOSIS — Z11.59 ENCOUNTER FOR SCREENING FOR OTHER VIRAL DISEASES: ICD-10-CM

## 2021-10-08 PROCEDURE — U0003 INFECTIOUS AGENT DETECTION BY NUCLEIC ACID (DNA OR RNA); SEVERE ACUTE RESPIRATORY SYNDROME CORONAVIRUS 2 (SARS-COV-2) (CORONAVIRUS DISEASE [COVID-19]), AMPLIFIED PROBE TECHNIQUE, MAKING USE OF HIGH THROUGHPUT TECHNOLOGIES AS DESCRIBED BY CMS-2020-01-R: HCPCS

## 2021-10-08 PROCEDURE — U0005 INFEC AGEN DETEC AMPLI PROBE: HCPCS

## 2021-10-08 PROCEDURE — 99207 PR NO CHARGE LOS: CPT

## 2021-10-09 LAB — SARS-COV-2 RNA RESP QL NAA+PROBE: NEGATIVE

## 2021-10-11 ENCOUNTER — HOSPITAL ENCOUNTER (OUTPATIENT)
Facility: CLINIC | Age: 81
Discharge: HOME | End: 2021-10-11
Attending: COLON & RECTAL SURGERY | Admitting: COLON & RECTAL SURGERY
Payer: MEDICARE

## 2021-10-11 VITALS
OXYGEN SATURATION: 98 % | BODY MASS INDEX: 26.41 KG/M2 | SYSTOLIC BLOOD PRESSURE: 143 MMHG | HEART RATE: 57 BPM | RESPIRATION RATE: 12 BRPM | WEIGHT: 195 LBS | DIASTOLIC BLOOD PRESSURE: 67 MMHG | HEIGHT: 72 IN | TEMPERATURE: 97.2 F

## 2021-10-11 LAB — COLONOSCOPY: NORMAL

## 2021-10-11 PROCEDURE — G0500 MOD SEDAT ENDO SERVICE >5YRS: HCPCS | Performed by: COLON & RECTAL SURGERY

## 2021-10-11 PROCEDURE — 250N000011 HC RX IP 250 OP 636: Performed by: COLON & RECTAL SURGERY

## 2021-10-11 PROCEDURE — G0105 COLORECTAL SCRN; HI RISK IND: HCPCS | Performed by: COLON & RECTAL SURGERY

## 2021-10-11 PROCEDURE — 45378 DIAGNOSTIC COLONOSCOPY: CPT | Performed by: COLON & RECTAL SURGERY

## 2021-10-11 RX ORDER — FENTANYL CITRATE 50 UG/ML
INJECTION, SOLUTION INTRAMUSCULAR; INTRAVENOUS PRN
Status: COMPLETED | OUTPATIENT
Start: 2021-10-11 | End: 2021-10-11

## 2021-10-11 RX ORDER — LIDOCAINE 40 MG/G
CREAM TOPICAL
Status: CANCELLED | OUTPATIENT
Start: 2021-10-11

## 2021-10-11 RX ORDER — ONDANSETRON 2 MG/ML
4 INJECTION INTRAMUSCULAR; INTRAVENOUS
Status: CANCELLED | OUTPATIENT
Start: 2021-10-11

## 2021-10-11 RX ADMIN — MIDAZOLAM 1 MG: 1 INJECTION INTRAMUSCULAR; INTRAVENOUS at 11:11

## 2021-10-11 RX ADMIN — MIDAZOLAM 1 MG: 1 INJECTION INTRAMUSCULAR; INTRAVENOUS at 11:12

## 2021-10-11 RX ADMIN — FENTANYL CITRATE 100 MCG: 50 INJECTION, SOLUTION INTRAMUSCULAR; INTRAVENOUS at 11:10

## 2021-10-11 ASSESSMENT — MIFFLIN-ST. JEOR: SCORE: 1622.51

## 2021-10-11 NOTE — H&P
Pre-Endoscopy History and Physical     Lucius Estrada MRN# [id number]   YOB: 1940 Age: 80 year old     Date of Procedure: 10/11/2021  Primary care provider: Chino Cochran  Type of Endoscopy: colonoscopy  Reason for Procedure: surveillance  Type of Anesthesia Anticipated: Moderate Sedation    HPI:    Lucius is a 80 year old male who will be undergoing the above procedure.      A history and physical has been performed. The patient's medications and allergies have been reviewed. The risks and benefits of the procedure including the risk of bleeding, perforation, and missed lesions as well as the sedation options and risks were discussed with the patient.  All questions were answered and informed consent was obtained.      Allergies   Allergen Reactions     Amoxicillin Hives     Brimonidine Other (See Comments)     Eye drops, redness, stye        No current facility-administered medications for this encounter.       Medications Prior to Admission   Medication Sig Dispense Refill Last Dose     amLODIPine (NORVASC) 5 MG tablet Take 0.5 tablets (2.5 mg) by mouth daily 45 tablet 0 10/11/2021 at Unknown time     finasteride (PROSCAR) 5 MG tablet Take 5 mg by mouth daily.   10/11/2021 at Unknown time     latanoprost (XALATAN) 0.005 % ophthalmic solution 1 drop At Bedtime.   10/10/2021 at Unknown time     losartan-hydrochlorothiazide (HYZAAR) 50-12.5 MG per tablet Take 1 tablet by mouth 2 times daily 30 tablet 3 10/11/2021 at Unknown time     SULINDAC PO Take 150 mg by mouth 2 times daily   Past Week at Unknown time     timolol (TIMOPTIC) 0.25 % ophthalmic solution Place 1 drop into the right eye 2 times daily   10/11/2021 at Unknown time     tadalafil (CIALIS) 10 MG tablet Take 1 tablet (10 mg) by mouth daily as needed 7 tablet 3        Patient Active Problem List   Diagnosis     Essential hypertension     Colon polyps     Drug-induced erectile dysfunction        Past Medical History:   Diagnosis Date      Basal cell carcinoma     melanoma x 2,  and multiple basal cell; see's Dr. Chavez and Dr. Victoria     BPH (benign prostatic hypertrophy)      Glaucoma      Hypertension     ECHO 2016- Left ventricular systolic function is normal. The visual ejection fraction is estimated at 55-60%     Multiple Colon polyps     multiple; took sulindac        Past Surgical History:   Procedure Laterality Date     COLONOSCOPY  11/1/2011    Procedure:COMBINED COLONOSCOPY, REMOVE TUMOR/POLYP/LESION BY SNARE; Colonoscopy ; Surgeon:VANESSA HALL; Location:[facility]     COLONOSCOPY  2/18/2014    Procedure: COMBINED COLONOSCOPY, SINGLE BIOPSY/POLYPECTOMY BY BIOPSY;;  Surgeon: Rudolph Tate MD;  Location: [facility]     COLONOSCOPY  5/14/2014    Procedure: COMBINED COLONOSCOPY, SINGLE BIOPSY/POLYPECTOMY BY BIOPSY;  Surgeon: Rudolph Tate MD;  Location: [facility]     COLONOSCOPY N/A 12/8/2014    Procedure: COMBINED COLONOSCOPY, SINGLE OR MULTIPLE BIOPSY/POLYPECTOMY BY BIOPSY;  Surgeon: Rudolph Tate MD;  Location: [facility]     COLONOSCOPY N/A 9/21/2015    Procedure: COMBINED COLONOSCOPY, SINGLE OR MULTIPLE BIOPSY/POLYPECTOMY BY BIOPSY;  Surgeon: Rudolph Tate MD;  Location: [facility]     COLONOSCOPY N/A 4/18/2016    Procedure: COLONOSCOPY;  Surgeon: Rudolph Tate MD;  Location: [facility]     COLONOSCOPY N/A 10/9/2017    Procedure: COLONOSCOPY;;  Surgeon: Rudolph Tate MD;  Location: [facility]     COLONOSCOPY N/A 10/7/2019    Procedure: COLONOSCOPY, WITH POLYPECTOMY AND BIOPSY;  Surgeon: Rudolph Tate MD;  Location: [facility]     ESOPHAGOSCOPY, GASTROSCOPY, DUODENOSCOPY (EGD), COMBINED N/A 4/18/2016    Procedure: COMBINED ESOPHAGOSCOPY, GASTROSCOPY, DUODENOSCOPY (EGD), BIOPSY SINGLE OR MULTIPLE;  Surgeon: Rudolph Tate MD;  Location: [facility]     HEMORRHOID SURGERY       SPHINCTEROTOMY RECTUM         Social History     Tobacco Use     Smoking status: Never Smoker     Smokeless tobacco: Never Used   Substance Use Topics     Alcohol use: Yes     Comment:  glass of  wine daily       Family History   Problem Relation Age of Onset     Hypertension Mother      Hypertension Father      Coronary Artery Disease Father      Coronary Artery Disease Maternal Grandfather      Colon Cancer No family hx of          PHYSICAL EXAM:   BP (!) 143/67   Pulse 53   Temp 97.2  F (36.2  C) (Skin)   Resp (!) 6   Ht 1.829 m (6')   Wt 88.5 kg (195 lb)   SpO2 99%   BMI 26.45 kg/m   Estimated body mass index is 26.45 kg/m  as calculated from the following:    Height as of this encounter: 1.829 m (6').    Weight as of this encounter: 88.5 kg (195 lb).   Mental status - alert and oriented  RESP: lungs clear  CV: RRR  AIRWAY EXAM: Mallampatti Class II (visualization of the soft palate, fauces, and uvula)    IMPRESSION   ASA Class 3 - Severe systemic disease, but not incapacitating      Signed Electronically by: Rudolph Tate MD  October 11, 2021    Colorectal Surgery  [phone] (office)  [phone] (pager)  [email or web address]

## 2022-04-02 ASSESSMENT — VISUAL ACUITY
OD_SC: 20/20
OD_CC: 20/30-2
OS_CC: 20/20-1
OS_SC: 20/20-1

## 2022-04-02 ASSESSMENT — TONOMETRY
OD_IOP_MMHG: 18
OS_IOP_MMHG: 22

## 2022-07-09 ENCOUNTER — TELEPHONE ENCOUNTER (OUTPATIENT)
Dept: URBAN - METROPOLITAN AREA CLINIC 121 | Facility: CLINIC | Age: 82
End: 2022-07-09

## 2022-07-10 ENCOUNTER — TELEPHONE ENCOUNTER (OUTPATIENT)
Dept: URBAN - METROPOLITAN AREA CLINIC 121 | Facility: CLINIC | Age: 82
End: 2022-07-10

## 2022-07-27 NOTE — PATIENT DISCUSSION
Suspect based on C/D OU.  Diagnostics reviewed with pt . Despite some risk factors, the patient does not demonstrate definitive evidence of glaucoma at this time.

## (undated) RX ORDER — FENTANYL CITRATE 50 UG/ML
INJECTION, SOLUTION INTRAMUSCULAR; INTRAVENOUS
Status: DISPENSED
Start: 2017-10-09

## (undated) RX ORDER — FENTANYL CITRATE 50 UG/ML
INJECTION, SOLUTION INTRAMUSCULAR; INTRAVENOUS
Status: DISPENSED
Start: 2021-10-11

## (undated) RX ORDER — FENTANYL CITRATE 50 UG/ML
INJECTION, SOLUTION INTRAMUSCULAR; INTRAVENOUS
Status: DISPENSED
Start: 2019-10-07